# Patient Record
Sex: FEMALE | NOT HISPANIC OR LATINO | URBAN - METROPOLITAN AREA
[De-identification: names, ages, dates, MRNs, and addresses within clinical notes are randomized per-mention and may not be internally consistent; named-entity substitution may affect disease eponyms.]

---

## 2019-05-16 ENCOUNTER — OFFICE VISIT (OUTPATIENT)
Dept: URBAN - METROPOLITAN AREA CLINIC 23 | Facility: CLINIC | Age: 74
End: 2019-05-16
Payer: MEDICARE

## 2019-05-16 DIAGNOSIS — E11.9 TYPE 2 DIABETES MELLITUS WITHOUT COMPLICATIONS: Primary | ICD-10-CM

## 2019-05-16 PROCEDURE — 92014 COMPRE OPH EXAM EST PT 1/>: CPT | Performed by: OPTOMETRIST

## 2019-05-16 PROCEDURE — 92134 CPTRZ OPH DX IMG PST SGM RTA: CPT | Performed by: OPTOMETRIST

## 2019-05-16 ASSESSMENT — INTRAOCULAR PRESSURE
OS: 13
OD: 13

## 2019-05-16 ASSESSMENT — KERATOMETRY
OD: 44.25
OS: 44.75

## 2019-05-16 ASSESSMENT — VISUAL ACUITY
OS: 20/40
OD: 20/50

## 2019-05-16 NOTE — IMPRESSION/PLAN
Impression: Combined forms of age-related cataract, bilateral: H25.813 OU. Plan: Cataracts account for the patient's complaints. Discussed diagnosis in detail with patient. Discussed all R/B's and procedures. Patient understands changing glasses prescription will not significantly improve vision. Patient elects to have surgery, phacoemulsification with intraocular lens recommended. Discussed lens options of Toric/Multifocal. Recommend standard IOL, target distance, RL2.  Will refer to cataract surgeon for pre-operative evaluation for OD>OS

## 2019-05-16 NOTE — IMPRESSION/PLAN
Impression: Bilateral nonexudative age-related macular degeneration, early dry stage: H35.3131 OU. Plan: Discussed diagnosis in detail with patient. Advised patient of condition. Patient instructed to take daily multi vitamin. Use of vitamins has shown to improve the effects of ARMD. Will continue to observe condition/symptoms.

## 2019-05-16 NOTE — IMPRESSION/PLAN
Impression: Type 2 diabetes mellitus without complications: S44.8 OU. Plan: Discussed diagnosis in detail with patient. Advised and emphasized patient of blood sugar control. Discussed risks of progression. Poor compliance can lead to blindness. OCT macula performed and reviewed with patient today- no signs of diabetic retinopathy. Reassured patient of condition and treatment. Will continue to observe condition and or symptoms.

## 2019-05-22 ENCOUNTER — OFFICE VISIT (OUTPATIENT)
Dept: URBAN - METROPOLITAN AREA CLINIC 23 | Facility: CLINIC | Age: 74
End: 2019-05-22
Payer: MEDICARE

## 2019-05-22 DIAGNOSIS — H25.813 COMBINED FORMS OF AGE-RELATED CATARACT, BILATERAL: Primary | ICD-10-CM

## 2019-05-22 PROCEDURE — 99203 OFFICE O/P NEW LOW 30 MIN: CPT | Performed by: OPHTHALMOLOGY

## 2019-05-22 PROCEDURE — 99214 OFFICE O/P EST MOD 30 MIN: CPT | Performed by: OPHTHALMOLOGY

## 2019-05-22 ASSESSMENT — INTRAOCULAR PRESSURE
OS: 12
OD: 11

## 2019-05-22 NOTE — IMPRESSION/PLAN
Impression: Combined forms of age-related cataract, bilateral: H25.813. Condition: quality of life issue. Symptoms: could improve with surgery. Vision: vision affected. Plan: Cataracts account for the patient's complaints. Discussed all risks, benefits, procedures and recovery. Patient understands changing glasses will not improve vision. Patient desires to have surgery, recommend phacoemulsification with intraocular lens. RL-2 Recommend standard IOL Aim plano.

## 2019-06-27 ENCOUNTER — PRE-OPERATIVE VISIT (OUTPATIENT)
Dept: URBAN - METROPOLITAN AREA CLINIC 23 | Facility: CLINIC | Age: 74
End: 2019-06-27
Payer: MEDICARE

## 2019-06-27 PROCEDURE — 92136 OPHTHALMIC BIOMETRY: CPT | Performed by: OPHTHALMOLOGY

## 2019-06-27 ASSESSMENT — PACHYMETRY
OS: 26.36
OS: 3.18
OD: 3.17
OD: 25.88

## 2019-07-25 ENCOUNTER — SURGERY (OUTPATIENT)
Dept: URBAN - METROPOLITAN AREA SURGERY 11 | Facility: SURGERY | Age: 74
End: 2019-07-25
Payer: MEDICARE

## 2019-07-25 PROCEDURE — 66984 XCAPSL CTRC RMVL W/O ECP: CPT | Performed by: OPHTHALMOLOGY

## 2019-07-26 ENCOUNTER — POST-OPERATIVE VISIT (OUTPATIENT)
Dept: URBAN - METROPOLITAN AREA CLINIC 23 | Facility: CLINIC | Age: 74
End: 2019-07-26

## 2019-07-26 PROCEDURE — 99024 POSTOP FOLLOW-UP VISIT: CPT | Performed by: OPTOMETRIST

## 2019-07-26 ASSESSMENT — INTRAOCULAR PRESSURE
OS: 11
OD: 11

## 2019-07-31 ENCOUNTER — POST-OPERATIVE VISIT (OUTPATIENT)
Dept: URBAN - METROPOLITAN AREA CLINIC 23 | Facility: CLINIC | Age: 74
End: 2019-07-31

## 2019-07-31 PROCEDURE — 99024 POSTOP FOLLOW-UP VISIT: CPT | Performed by: OPTOMETRIST

## 2019-07-31 ASSESSMENT — INTRAOCULAR PRESSURE
OS: 13
OD: 13

## 2019-07-31 ASSESSMENT — VISUAL ACUITY
OD: 20/20
OS: 20/30

## 2019-08-08 ENCOUNTER — SURGERY (OUTPATIENT)
Dept: URBAN - METROPOLITAN AREA SURGERY 11 | Facility: SURGERY | Age: 74
End: 2019-08-08
Payer: MEDICARE

## 2019-08-08 PROCEDURE — 66984 XCAPSL CTRC RMVL W/O ECP: CPT | Performed by: OPHTHALMOLOGY

## 2019-08-09 ENCOUNTER — POST-OPERATIVE VISIT (OUTPATIENT)
Dept: URBAN - METROPOLITAN AREA CLINIC 23 | Facility: CLINIC | Age: 74
End: 2019-08-09
Payer: MEDICARE

## 2019-08-09 PROCEDURE — 99024 POSTOP FOLLOW-UP VISIT: CPT | Performed by: OPTOMETRIST

## 2019-08-09 ASSESSMENT — INTRAOCULAR PRESSURE
OS: 14
OD: 14

## 2019-08-15 ENCOUNTER — POST-OPERATIVE VISIT (OUTPATIENT)
Dept: URBAN - METROPOLITAN AREA CLINIC 23 | Facility: CLINIC | Age: 74
End: 2019-08-15
Payer: MEDICARE

## 2019-08-15 PROCEDURE — 99024 POSTOP FOLLOW-UP VISIT: CPT | Performed by: OPTOMETRIST

## 2019-08-15 ASSESSMENT — VISUAL ACUITY
OD: 20/25
OS: 20/20

## 2019-08-15 ASSESSMENT — INTRAOCULAR PRESSURE
OS: 11
OD: 10

## 2019-09-05 ENCOUNTER — POST-OPERATIVE VISIT (OUTPATIENT)
Dept: URBAN - METROPOLITAN AREA CLINIC 23 | Facility: CLINIC | Age: 74
End: 2019-09-05
Payer: MEDICARE

## 2019-09-05 DIAGNOSIS — Z09 ENCNTR FOR F/U EXAM AFT TRTMT FOR COND OTH THAN MALIG NEOPLM: Primary | ICD-10-CM

## 2019-09-05 PROCEDURE — 99024 POSTOP FOLLOW-UP VISIT: CPT | Performed by: OPTOMETRIST

## 2019-09-05 ASSESSMENT — INTRAOCULAR PRESSURE
OD: 13
OS: 12

## 2019-09-05 ASSESSMENT — VISUAL ACUITY
OS: 20/20
OD: 20/20

## 2020-10-01 ENCOUNTER — OFFICE VISIT (OUTPATIENT)
Dept: URBAN - METROPOLITAN AREA CLINIC 23 | Facility: CLINIC | Age: 75
End: 2020-10-01
Payer: MEDICARE

## 2020-10-01 DIAGNOSIS — H35.3131 BILATERAL NONEXUDATIVE AGE-RELATED MACULAR DEGENERATION, EARLY DRY STAGE: ICD-10-CM

## 2020-10-01 PROCEDURE — 92014 COMPRE OPH EXAM EST PT 1/>: CPT | Performed by: OPTOMETRIST

## 2020-10-01 PROCEDURE — 92134 CPTRZ OPH DX IMG PST SGM RTA: CPT | Performed by: OPTOMETRIST

## 2020-10-01 ASSESSMENT — KERATOMETRY
OS: 44.63
OD: 44.88

## 2020-10-01 ASSESSMENT — INTRAOCULAR PRESSURE
OD: 8
OS: 8

## 2020-10-01 NOTE — IMPRESSION/PLAN
Impression: Type 2 diabetes mellitus without complications: L62.1 Bilateral. Plan: Discussed diagnosis in detail with patient. Advised and emphasized patient of blood sugar control. Discussed risks of progression. Poor compliance can lead to blindness. OCT macula performed and reviewed with patient today. Reassured patient of condition and treatment. Will continue to observe condition and or symptoms.

## 2020-10-01 NOTE — IMPRESSION/PLAN
Impression: Bilateral nonexudative age-related macular degeneration, early dry stage: H35.3131 Bilateral. Plan: Discussed diagnosis in detail with patient. Advised patient of condition. Patient instructed to take daily multi vitamin. Use of vitamins has shown to improve the effects of ARMD. Will continue to observe condition/symptoms.

## 2022-02-24 ENCOUNTER — OFFICE VISIT (OUTPATIENT)
Dept: URBAN - METROPOLITAN AREA CLINIC 28 | Facility: CLINIC | Age: 77
End: 2022-02-24
Payer: MEDICARE

## 2022-02-24 PROCEDURE — 92004 COMPRE OPH EXAM NEW PT 1/>: CPT | Performed by: OPTOMETRIST

## 2022-02-24 PROCEDURE — 92134 CPTRZ OPH DX IMG PST SGM RTA: CPT | Performed by: OPTOMETRIST

## 2022-02-24 ASSESSMENT — KERATOMETRY
OS: 44.88
OD: 44.50

## 2022-02-24 ASSESSMENT — INTRAOCULAR PRESSURE
OS: 15
OD: 15

## 2022-02-24 NOTE — IMPRESSION/PLAN
Impression: Type 2 diabetes mellitus without complications: L56.8. Plan: Educated on importance of blood glucose control as related to ocular health. No diabetic retinopathy or maculopathy present. Monitor with annual dilation.

## 2022-02-24 NOTE — IMPRESSION/PLAN
Impression: Nonexudative age-related macular degeneration, bilateral, early dry stage: H35.3131. Plan: Educated on macular degeneration and exam findings. Educated on importance of UV eye protection, avoidance of smoke, healthy diet with green leafy vegetables, and home monitoring of vision with Amsler grid. Recommend AREDS2 BID. Emphasized importance of regular monitoring.

## 2023-03-01 ENCOUNTER — OFFICE VISIT (OUTPATIENT)
Dept: URBAN - METROPOLITAN AREA CLINIC 22 | Facility: CLINIC | Age: 78
End: 2023-03-01
Payer: MEDICARE

## 2023-03-01 DIAGNOSIS — E11.9 TYPE 2 DIABETES MELLITUS W/O COMPLICATION: Primary | ICD-10-CM

## 2023-03-01 DIAGNOSIS — H35.363 DRUSEN (DEGENERATIVE) OF MACULA, BILATERAL: ICD-10-CM

## 2023-03-01 DIAGNOSIS — Z96.1 PRESENCE OF INTRAOCULAR LENS: ICD-10-CM

## 2023-03-01 DIAGNOSIS — H43.813 VITREOUS DEGENERATION, BILATERAL: ICD-10-CM

## 2023-03-01 DIAGNOSIS — H53.2 DIPLOPIA: ICD-10-CM

## 2023-03-01 PROCEDURE — 92014 COMPRE OPH EXAM EST PT 1/>: CPT | Performed by: STUDENT IN AN ORGANIZED HEALTH CARE EDUCATION/TRAINING PROGRAM

## 2023-03-01 PROCEDURE — 92134 CPTRZ OPH DX IMG PST SGM RTA: CPT | Performed by: STUDENT IN AN ORGANIZED HEALTH CARE EDUCATION/TRAINING PROGRAM

## 2023-03-01 ASSESSMENT — INTRAOCULAR PRESSURE
OS: 13
OD: 13

## 2023-03-01 ASSESSMENT — VISUAL ACUITY
OS: 20/20
OD: 20/20

## 2023-03-01 NOTE — IMPRESSION/PLAN
Impression: Type 2 diabetes mellitus w/o complication: C22.1. Plan: Discussed findings, no retinopathy or macular edema OU. Patient educated on importance of well-controlled blood sugar/pressure, risk of vision loss from diabetic retinopathy, and dilated eye exams. Continue management with PCP.

## 2023-03-01 NOTE — IMPRESSION/PLAN
Impression: Diplopia: H53.2. Plan: Patient c/o occasional diplopia that she can clear up w/ focusing. Also sees better closing one eye. Rec'd trialing glasses Rx with prism.  RTC next available vision exam.

## 2023-03-01 NOTE — IMPRESSION/PLAN
Impression: Drusen (degenerative) of macula, bilateral: H35.363. Plan: Discussed findings. OCT ordered: no SRF OU. Stable vision OU. Continue to monitor with annual dilated exam and OCT. Patient directed to RTC sooner if any sudden changes to vision. Recommend UV protection and healthy diet rich in antioxidants.

## 2023-03-02 ENCOUNTER — TESTING ONLY (OUTPATIENT)
Dept: URBAN - METROPOLITAN AREA CLINIC 22 | Facility: CLINIC | Age: 78
End: 2023-03-02
Payer: MEDICARE

## 2023-03-02 DIAGNOSIS — H52.4 PRESBYOPIA: Primary | ICD-10-CM

## 2023-03-02 ASSESSMENT — VISUAL ACUITY
OD: 20/20
OS: 20/20

## 2023-03-02 NOTE — IMPRESSION/PLAN
Impression: Presbyopia: H52.4. Plan: Discussed findings. New Rx with prism finalized and given to patient. Requesting  glasses only due to vertigo issues.

## 2024-06-25 ENCOUNTER — OFFICE VISIT (OUTPATIENT)
Dept: URBAN - METROPOLITAN AREA CLINIC 22 | Facility: CLINIC | Age: 79
End: 2024-06-25
Payer: COMMERCIAL

## 2024-06-25 DIAGNOSIS — H35.363 DRUSEN (DEGENERATIVE) OF MACULA, BILATERAL: ICD-10-CM

## 2024-06-25 DIAGNOSIS — E11.9 TYPE 2 DIABETES MELLITUS W/O COMPLICATION: Primary | ICD-10-CM

## 2024-06-25 DIAGNOSIS — Z96.1 PRESENCE OF INTRAOCULAR LENS: ICD-10-CM

## 2024-06-25 DIAGNOSIS — Z79.84 LONG TERM CURRENT USE OF ORAL HYPOGLYCEMIC DRUG: ICD-10-CM

## 2024-06-25 DIAGNOSIS — H53.2 DIPLOPIA: ICD-10-CM

## 2024-06-25 DIAGNOSIS — H43.813 VITREOUS DEGENERATION, BILATERAL: ICD-10-CM

## 2024-06-25 PROCEDURE — 92134 CPTRZ OPH DX IMG PST SGM RTA: CPT | Performed by: STUDENT IN AN ORGANIZED HEALTH CARE EDUCATION/TRAINING PROGRAM

## 2024-06-25 PROCEDURE — 92014 COMPRE OPH EXAM EST PT 1/>: CPT | Performed by: STUDENT IN AN ORGANIZED HEALTH CARE EDUCATION/TRAINING PROGRAM

## 2024-06-25 ASSESSMENT — VISUAL ACUITY
OS: 20/25
OD: 20/25

## 2024-06-25 ASSESSMENT — INTRAOCULAR PRESSURE
OS: 13
OD: 12